# Patient Record
Sex: MALE | Race: ASIAN | NOT HISPANIC OR LATINO | URBAN - METROPOLITAN AREA
[De-identification: names, ages, dates, MRNs, and addresses within clinical notes are randomized per-mention and may not be internally consistent; named-entity substitution may affect disease eponyms.]

---
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
## 2023-07-07 NOTE — ED PROVIDER NOTE - CROS ED ALLERGIC IMMUN ALL NEG
Here are some general guidelines to protect the fluoride varnish applied in today's visit.    Your child can eat and drink right away after varnish is applied but should AVOID hot liquids or sticky/crunchy foods for 24 hours.    Don't brush or floss your teeth for the next 4-6 hours and resume regular brushing, flossing and dental checkups after this initial time period.    Patient Education    [employer] HANDOUT- PARENT  12 MONTH VISIT  Here are some suggestions from [employer] experts that may be of value to your family.     HOW YOUR FAMILY IS DOING  If you are worried about your living or food situation, reach out for help. Community agencies and programs such as WIC and "[employer]" can provide information and assistance.  Don t smoke or use e-cigarettes. Keep your home and car smoke-free. Tobacco-free spaces keep children healthy.  Don t use alcohol or drugs.  Make sure everyone who cares for your child offers healthy foods, avoids sweets, provides time for active play, and uses the same rules for discipline that you do.  Make sure the places your child stays are safe.  Think about joining a toddler playgroup or taking a parenting class.  Take time for yourself and your partner.  Keep in contact with family and friends.    ESTABLISHING ROUTINES   Praise your child when he does what you ask him to do.  Use short and simple rules for your child.  Try not to hit, spank, or yell at your child.  Use short time-outs when your child isn t following directions.  Distract your child with something he likes when he starts to get upset.  Play with and read to your child often.  Your child should have at least one nap a day.  Make the hour before bedtime loving and calm, with reading, singing, and a favorite toy.  Avoid letting your child watch TV or play on a tablet or smartphone.  Consider making a family media plan. It helps you make rules for media use and balance screen time with other activities, including  exercise.    FEEDING YOUR CHILD   Offer healthy foods for meals and snacks. Give 3 meals and 2 to 3 snacks spaced evenly over the day.  Avoid small, hard foods that can cause choking-- popcorn, hot dogs, grapes, nuts, and hard, raw vegetables.  Have your child eat with the rest of the family during mealtime.  Encourage your child to feed herself.  Use a small plate and cup for eating and drinking.  Be patient with your child as she learns to eat without help.  Let your child decide what and how much to eat. End her meal when she stops eating.  Make sure caregivers follow the same ideas and routines for meals that you do.    FINDING A DENTIST   Take your child for a first dental visit as soon as her first tooth erupts or by 12 months of age.  Brush your child s teeth twice a day with a soft toothbrush. Use a small smear of fluoride toothpaste (no more than a grain of rice).  If you are still using a bottle, offer only water.    SAFETY   Make sure your child s car safety seat is rear facing until he reaches the highest weight or height allowed by the car safety seat s . In most cases, this will be well past the second birthday.  Never put your child in the front seat of a vehicle that has a passenger airbag. The back seat is safest.  Place morrison at the top and bottom of stairs. Install operable window guards on windows at the second story and higher. Operable means that, in an emergency, an adult can open the window.  Keep furniture away from windows.  Make sure TVs, furniture, and other heavy items are secure so your child can t pull them over.  Keep your child within arm s reach when he is near or in water.  Empty buckets, pools, and tubs when you are finished using them.  Never leave young brothers or sisters in charge of your child.  When you go out, put a hat on your child, have him wear sun protection clothing, and apply sunscreen with SPF of 15 or higher on his exposed skin. Limit time outside when  the sun is strongest (11:00 am-3:00 pm).  Keep your child away when your pet is eating. Be close by when he plays with your pet.  Keep poisons, medicines, and cleaning supplies in locked cabinets and out of your child s sight and reach.  Keep cords, latex balloons, plastic bags, and small objects, such as marbles and batteries, away from your child. Cover all electrical outlets.  Put the Poison Help number into all phones, including cell phones. Call if you are worried your child has swallowed something harmful. Do not make your child vomit.    WHAT TO EXPECT AT YOUR BABY S 15 MONTH VISIT  We will talk about    Supporting your child s speech and independence and making time for yourself    Developing good bedtime routines    Handling tantrums and discipline    Caring for your child s teeth    Keeping your child safe at home and in the car        Helpful Resources:  Smoking Quit Line: 798.868.6211  Family Media Use Plan: www.[employer].org/MediaUsePlan  Poison Help Line: 431.510.6710  Information About Car Safety Seats: www.safercar.gov/parents  Toll-free Auto Safety Hotline: 969.333.4357  Consistent with Bright Futures: Guidelines for Health Supervision of Infants, Children, and Adolescents, 4th Edition  For more information, go to https://brightfutures.aap.org.             Keeping Children Safe in and Around Water  Playing in the pool, the ocean, and even the bathtub can be good fun and exercise for a child. But did you know that a child can drown in only an inch of water? Hundreds of kids drown each year, so practicing good water safety is critical. Three important things you can do to keep your child safe are:         A fence with the features shown above is an effective way to keep children away from a swimming pool.       Always supervise your child in the water--even if your child knows how to swim.    If you have a pool, use multiple barriers to keep your child away from the pool when you re not  around. A four-sided fence is an ideal barrier.    Learn CPR.  An easy way to help keep your child safe is to learn infant and child CPR (cardiopulmonary resuscitation). This simple skill could save your child s life:    All caregivers, including grandparents, should know CPR.    To find a class, check for one given by your local [place] chapter at www.[employer].org. You can also find the American Heart Association course catalog at cpr.heart.org/en/lhdwre-cywlbgc-dzwqzm. You can also contact your local fire department for CPR classes.   Swimming safety tips  Supervise at all times  Here are suggestions for supervision:    Have a  water watcher  while kids are swimming. This adult s sole job is to watch the kids. He or she should not talk on the phone, read, or cook while supervising.    For young children, make sure an adult is in the water, within an arm s distance of kids.    Make sure all adults who supervise children know how to swim.    If a child can t swim, pay extra attention while supervising. Also don t rely on inflatable toys to keep your child afloat. Instead, use a Coast Guard-certified life jacket. And make sure the child stays in shallow water where his or her feet reach the bottom.    Have children wear a Coast Guard-certified life jacket whenever they are in or around natural bodies of water, even if they know how to swim. This includes lakes and the ocean.  Have your child take swimming lessons  Here are suggestions for lessons:    Give lessons according to your child s developmental level, and when he or she is ready. The American Academy of Pediatrics recommends starting lessons for many children at age 1.    Make sure lessons are ongoing and given by a qualified instructor.    Keep in mind that a child who has had lessons and knows how to swim can still drown. Take safety precautions with every child.  Make sure every child follows these swimming rules  Share these rules with all children in  your care:    Only swim in designated swimming areas in pools, lakes, and other bodies of water.    Always swim with a earlene, never alone.    Never run near a pool.    Dive only when and where it s posted that diving is OK. Never dive into water if posted rules don t allow it, or if the water is less than 9 feet deep. And never dive into a river, a lake, or the ocean.    Listen to the adult in charge. Always follow the rules.    If someone is having trouble swimming, don t go in the water. Instead try to find something to throw to the person to help him or her, such as a life preserver.  Follow these other safety tips  Other tips include:    Have swimmers with long hair tie it up before they go swimming in a pool. This helps keep the hair from getting tangled in a drain.    Keep toys out of the pool when not in use. This prevents your child from reaching for them from the poolside.    Keep a phone near the pool for emergencies.    Don't allow children to swim outdoors during thunderstorms or lightning storms.  Swimming pool safety  Inground pools  Tips for inground pool safety include:    Use several barriers, such as fences and doors, around the pool. No barrier is 100% effective, so using several can provide extra levels of safety.    Use a four-sided fence that is at least 4 feet high. It should not allow access to the pool directly from the house.    Use a self-closing fence gate. Make sure it has a self-latching lock that young children can t reach.    Install loud alarms for any doors or morrison that lead to the pool area.    Tell kids to stay away from pool drains. Also make sure you use drain covers that prevent entrapment and have a valve turn-off. This means the drain pump will turn off if something gets caught in the drain. And use an approved drain cover.  Above-ground pools  Tips for above-ground pool safety include:    Follow the same barrier recommendations as for inground pools (see above).    Make  sure ladders are not left down in the water when the pool is not in use.    Keep children out of hot tubs and spas. Kids can easily overheat or dehydrate. If you have a hot tub or spa, use an approved cover with a lock.  Kiddie pools  Tips for kiddie pool safety include:    Empty them of water after every use, no matter how shallow the water is.    Always supervise children, even in kiddie pools.  Other water safety tips  At home  Tips for at-home water safety include:    Don t use electrical appliances near water.    Use toilet seat locks.    Empty all buckets and dishpans when not in use. Store them upside down.    Cover ponds and other water sources with mesh.    Get rid of all standing water in the yard.  At the beach  Tips for water safety at the beach include:    Supervise your child at all times.    Only go to beaches where lifeguards are on duty.    Be aware of dangerous surf that can pull down and drown your child.    Be aware of drop-offs, where the water suddenly goes from shallow to deep. Tell children to stay away from them.    Teach your child what to do if he or she swims too far from shore: stay calm, tread water, and raise an arm to signal for help.  While boating  Tips for boating safety include:    Have your child wear a Coast Guard-approved life vest at all times. And have him or her practice swimming while wearing the life vest before going out on a boat.    Check with your state about the age a person must be to operate personal watercraft or any water vehicle with a motor. Each state is different.  If an accident happens  If your child is in a water accident, every second counts. Do the following right away:    [place] for help, and carefully pull or lift the child out of the water.    If you re trained, start CPR, and have someone call 911 or emergency services. If you don t know CPR, the  will instruct you by phone.    If you re alone, carry the child to the phone and call 911, then  start or continue CPR.    Even if the child seems normal when revived, get medical care.  [employer] last reviewed this educational content on 12/1/2021 2000-2023 [employer]. All rights reserved. This information is not intended as a substitute for professional medical care. Always follow your healthcare professional's instructions.         negative - no atopy